# Patient Record
Sex: MALE | Race: WHITE | NOT HISPANIC OR LATINO | URBAN - METROPOLITAN AREA
[De-identification: names, ages, dates, MRNs, and addresses within clinical notes are randomized per-mention and may not be internally consistent; named-entity substitution may affect disease eponyms.]

---

## 2018-01-25 NOTE — PATIENT DISCUSSION
CATARACT, OU - VISUALLY SIGNIFICANT. SCHEDULE PHACO WITH IOL OD FIRST THEN OS IF VISUAL SYMPTOMS PERSIST. GLASSES RX GIVEN TO FILL IF DESIRES IN THE EVENT PATIENT DOES NOT PROCEED WITH SURGERY. MUST HAVE RETINAL CLEARANCE FROM DR. Schuyler Urena WITH CATARACT SURGERY.

## 2018-01-25 NOTE — PATIENT DISCUSSION
NONPROLIFERATIVE DIABETIC RETINOPATHY, OU: WITHOUT EDEMA OD, WITH EDEMA OS. REFER TO DR. Bearden Speaker FOR EVALUATION AND POSSIBLE TREATMENT.

## 2018-02-21 NOTE — PATIENT DISCUSSION
NONPROLIFERATIVE DIABETIC RETINOPATHY, OU:  NO CSDME OU. STABLE.  RETURN FOR FOLLOW-UP AS SCHEDULED FOR DILATED EYE EXAM.

## 2018-02-22 NOTE — PATIENT DISCUSSION
Surgery Drop Counseling:  I have prescribed Besivance, Ilevro and Durezol for use as directed before and after cataract surgery.

## 2018-03-07 NOTE — PATIENT DISCUSSION
Continue: Ilevro (nepafenac): drops,suspension: 0.3% 1 drop every morning as directed into right eye 02-

## 2018-03-07 NOTE — PATIENT DISCUSSION
Comments: Start one week prior to surgery and continue for 8 weeks after surgery. Called patient 2/23.  (ME)

## 2018-03-07 NOTE — PATIENT DISCUSSION
S/P PC IOL, OD: GOOD POST OP RESULT. STOP ANTIBIOTIC DROP IN ONE WEEK. CONTINUE OTHER DROPS AS DIRECTED UNTIL FURTHER INSTRUCTION. RETURN FOR FOLLOW-UP AS SCHEDULED.

## 2018-03-12 NOTE — PATIENT DISCUSSION
*Pre-Op 2nd Eye Counseling: The patient has noticed an improvement in their visual symptoms in the operative eye. The patient complains of decreased vision in the fellow eye when driving and watching television. It was explained to the patient that the decision to proceed with cataract surgery in the fellow eye is entirely a separate decision from the surgical eye. All of the same risks, benefits and alternatives ere reviewed with the patient again. The patient does feel the vision in the non-operative eye is limiting their daily activities and elects to proceed with surgery in the cataract eye.

## 2018-03-12 NOTE — PATIENT DISCUSSION
Continue as directed in the right eye until April 30th. Start in the left eye 7 days prior to surgery and continue for 8 weeks after surgery.

## 2018-03-12 NOTE — PATIENT DISCUSSION
Continue in the right eye for 1 more day, then stop. Start in the left eye 2 days prior to surgery and continue as directed.

## 2018-03-21 NOTE — PATIENT DISCUSSION
S/P PC IOL, OS : GOOD POST OP RESULT. STOP ANTIBIOTIC DROP IN ONE WEEK. CONTINUE OTHER DROPS AS DIRECTED UNTIL FURTHER INSTRUCTION. CONTINUE ILEVRO QAM UNTIL 05/16/2018. RETURN FOR FOLLOW-UP IN 2 WEEKS.

## 2018-03-21 NOTE — PATIENT DISCUSSION
Continue: Besivance (besifloxacin): drops,suspension: 0.6% 1 drop three times a day as directed into affected eye 02-

## 2018-04-09 NOTE — PATIENT DISCUSSION
Post-Op Instructions OS: Durezol: 1 times per day for 1 week, then discontinue. Ilevro (Nepafenac) 1 time per day for 1 week, then discontinue.

## 2019-09-14 NOTE — PATIENT DISCUSSION
EXAM DESCRIPTION:  CT CERVICAL SPINE WITHOUT; CT FACIAL AREA WITHOUT; CT HEAD WITHOUT



COMPLETED DATE/TIME:  9/14/2019 10:14 am



REASON FOR STUDY:  fall, syncope, r periorbital injury



COMPARISON:  See below.



TECHNIQUE:  Axial images acquired through the brain, facial bones, cervical spine without intravenous
 contrast.  Images reviewed with brain, subdural, lung, soft tissue and bone windows.  Reconstructed 
coronal and sagittal MPR images reviewed.  Images stored on PACS.

All CT scanners at this facility use dose modulation, iterative reconstruction, and/or weight based d
osing when appropriate to reduce radiation dose to as low as reasonably achievable (ALARA).

[facility]: Dose Right  [facility]: CareDose    [facility]: Dose Right    [facility]: Teradose 4D    [facility]: Smart Technologies



RADIATION DOSE:  CT Rad equipment meets quality standard of care and radiation dose reduction techniq
ues were employed. CTDIvol: 21.5 mGy. DLP: 420 mGy-cm.; CT Rad equipment meets quality standard of ca
re and radiation dose reduction techniques were employed. CTDIvol: 30.4 mGy. DLP: 517 mGy-cm.; CT Rad
 equipment meets quality standard of care and radiation dose reduction techniques were employed. CTDI
vol: 53.2 mGy. DLP: 1070 mGy-cm. mGy.



LIMITATIONS:  None.



FINDINGS:  Brain

2019 comparison.  Right frontal scalp hematoma without underlying fracture.  No acute intracranial ab
normality.  Mild small vessel disease.  No hydrocephalus.  Mild sphenoid sinus mucosal thickening.

Face

No facial fracture.  Orbits intact.

Cervical spine

2014 comparison.  Osteopenic.  Spondylosis with disc disease most pronounced at C4-5 and C5-6.  No fr
acture or malalignment.  Soft tissues normal.



IMPRESSION:

1. Soft tissue injury right frontal scalp.

2. No acute intracranial abnormality.

3. No facial fracture.

4. No cervical spine fracture or malalignment.



TECHNICAL DOCUMENTATION:  JOB ID:  [id number]

Quality ID # [id number]: Final reports with documentation of one or more dose reduction techniques (e.g., Au
tomated exposure control, adjustment of the mA and/or kV according to patient size, use of iterative 
reconstruction technique)

 2011 [employer]- All Rights Reserved



Reading location - IP/workstation name: [email or web address] - Daily wearOD+1.00+[id number]+[id number]/25 -2&nbsp;SN &nbsp; &nbsp;

## 2022-10-11 NOTE — PATIENT DISCUSSION
Medications: Review of Systems   Constitutional: Negative. Negative for appetite change, chills and fatigue. Eyes: Negative. Negative for pain, redness and visual disturbance. Respiratory: Negative. Negative for cough, shortness of breath and wheezing. Cardiovascular: Negative. Negative for chest pain and leg swelling. Gastrointestinal: Negative. Negative for abdominal pain, constipation, diarrhea, nausea and vomiting. Genitourinary: Negative. Negative for difficulty urinating, dysuria, flank pain, frequency, hematuria and urgency. Musculoskeletal: Negative. Negative for back pain, joint swelling and myalgias. Skin: Negative. Negative for rash and wound. Neurological: Negative. Negative for dizziness, weakness and numbness. Hematological: Negative. Does not bruise/bleed easily.

## 2022-11-14 ENCOUNTER — CONSULTATION/EVALUATION (OUTPATIENT)
Dept: URBAN - METROPOLITAN AREA CLINIC 14 | Facility: CLINIC | Age: 32
End: 2022-11-14

## 2022-11-14 DIAGNOSIS — H52.7: ICD-10-CM

## 2022-11-14 PROCEDURE — 99499LK REFRACTIVE CONSULT/LASIK

## 2022-11-14 ASSESSMENT — KERATOMETRY
OS_AXISANGLE_DEGREES: 84
OS_K2POWER_DIOPTERS: 45.75
OD_K1POWER_DIOPTERS: 44.75
OD_AXISANGLE2_DEGREES: 2
OD_AXISANGLE_DEGREES: 92
OS_K1POWER_DIOPTERS: 45.00
OD_K2POWER_DIOPTERS: 45.50
OS_AXISANGLE2_DEGREES: 174

## 2022-11-14 ASSESSMENT — VISUAL ACUITY
OD_SC: 20/70
OD_BCVA: 20/20
OS_SC: 20/60
OS_CC: 20/20
OD_CC: 20/20
OS_BCVA: 20/20

## 2022-11-14 ASSESSMENT — TONOMETRY
OD_IOP_MMHG: 15
OS_IOP_MMHG: 16

## 2023-01-11 ASSESSMENT — VISUAL ACUITY
OD_BCVA: 20/20
OS_BCVA: 20/20

## 2023-01-12 ENCOUNTER — CLINIC PROCEDURE ONLY (OUTPATIENT)
Dept: URBAN - METROPOLITAN AREA CLINIC 14 | Facility: CLINIC | Age: 33
End: 2023-01-12

## 2023-01-12 DIAGNOSIS — H52.7: ICD-10-CM

## 2023-01-12 PROCEDURE — 66999NC LASER SUITE OFFICE PROCEDURE NO CHARGE

## 2023-01-13 ENCOUNTER — CLINIC PROCEDURE ONLY (OUTPATIENT)
Dept: URBAN - METROPOLITAN AREA CLINIC 14 | Facility: CLINIC | Age: 33
End: 2023-01-13

## 2023-01-13 DIAGNOSIS — Z98.890: ICD-10-CM

## 2023-01-13 DIAGNOSIS — H52.7: ICD-10-CM

## 2023-01-13 PROCEDURE — 99024LK POST OP LASIK CARE ONLY

## 2023-01-13 ASSESSMENT — VISUAL ACUITY
OD_SC: 20/20
OU_SC: 20/20
OS_SC: 20/20

## 2023-01-19 ENCOUNTER — POST-OP (OUTPATIENT)
Dept: URBAN - METROPOLITAN AREA CLINIC 14 | Facility: CLINIC | Age: 33
End: 2023-01-19

## 2023-01-19 DIAGNOSIS — Z98.890: ICD-10-CM

## 2023-01-19 DIAGNOSIS — H52.7: ICD-10-CM

## 2023-01-19 PROCEDURE — 99024LK POST OP LASIK CARE ONLY

## 2023-01-19 ASSESSMENT — VISUAL ACUITY
OS_SC: 20/20
OD_SC: 20/20
OU_SC: 20/20

## 2023-04-20 ENCOUNTER — POST-OP (OUTPATIENT)
Dept: URBAN - METROPOLITAN AREA CLINIC 14 | Facility: CLINIC | Age: 33
End: 2023-04-20

## 2023-04-20 DIAGNOSIS — Z98.890: ICD-10-CM

## 2023-04-20 DIAGNOSIS — H52.7: ICD-10-CM

## 2023-04-20 PROCEDURE — 99024LK POST OP LASIK CARE ONLY

## 2023-04-20 ASSESSMENT — VISUAL ACUITY
OD_SC: 20/15
OS_BCVA: 20/15
OD_BCVA: 20/15
OS_SC: 20/15
OU_SC: 20/15

## 2023-07-20 ENCOUNTER — POST-OP (OUTPATIENT)
Dept: URBAN - METROPOLITAN AREA CLINIC 14 | Facility: CLINIC | Age: 33
End: 2023-07-20

## 2023-07-20 DIAGNOSIS — Z98.890: ICD-10-CM

## 2023-07-20 PROCEDURE — 99024LK POST OP LASIK CARE ONLY

## 2023-07-20 ASSESSMENT — VISUAL ACUITY
OD_SC: 20/20
OS_SC: 20/20

## 2024-08-12 ENCOUNTER — OFFICE VISIT (OUTPATIENT)
Facility: LOCATION | Age: 34
End: 2024-08-12
Payer: COMMERCIAL

## 2024-08-12 DIAGNOSIS — H43.13 VITREOUS HEMORRHAGE, BILATERAL: ICD-10-CM

## 2024-08-12 DIAGNOSIS — E10.3513 TYPE 1 DIAB WITH PROLIF DIAB RTNOP WITH MACULAR EDEMA, BI: ICD-10-CM

## 2024-08-12 DIAGNOSIS — E10.3521 TYPE 1 DIAB W PROLIF DIAB RTNOP W TRCTN DTCH MACULA, R EYE: Primary | ICD-10-CM

## 2024-08-12 DIAGNOSIS — H25.13 AGE-RELATED NUCLEAR CATARACT, BILATERAL: ICD-10-CM

## 2024-08-12 PROCEDURE — 67028 INJECTION EYE DRUG: CPT | Performed by: OPHTHALMOLOGY

## 2024-08-12 PROCEDURE — 92134 CPTRZ OPH DX IMG PST SGM RTA: CPT | Performed by: OPHTHALMOLOGY

## 2024-08-12 PROCEDURE — 99204 OFFICE O/P NEW MOD 45 MIN: CPT | Performed by: OPHTHALMOLOGY

## 2024-08-12 ASSESSMENT — INTRAOCULAR PRESSURE
OS: 19
OD: 15

## 2024-09-20 ENCOUNTER — OFFICE VISIT (OUTPATIENT)
Dept: URBAN - METROPOLITAN AREA CLINIC 86 | Facility: CLINIC | Age: 34
End: 2024-09-20
Payer: COMMERCIAL

## 2024-09-20 DIAGNOSIS — E10.3521 TYPE 1 DIABETES MELLITUS WITH PROLIFERATIVE DIABETIC RETINOPATHY WITH TRACTION RETINAL DETACHMENT INVOLVING THE MACULA, RIGHT EYE: Primary | ICD-10-CM

## 2024-09-20 PROCEDURE — 67028 INJECTION EYE DRUG: CPT | Performed by: OPHTHALMOLOGY

## 2024-09-20 ASSESSMENT — INTRAOCULAR PRESSURE
OD: 17
OS: 18

## 2024-09-23 ENCOUNTER — POST-OPERATIVE VISIT (OUTPATIENT)
Dept: URBAN - METROPOLITAN AREA CLINIC 13 | Facility: CLINIC | Age: 34
End: 2024-09-23
Payer: COMMERCIAL

## 2024-09-23 DIAGNOSIS — E10.3521 TYPE 1 DIAB W PROLIF DIAB RTNOP W TRCTN DTCH MACULA, R EYE: Primary | ICD-10-CM

## 2024-09-23 PROCEDURE — 99024 POSTOP FOLLOW-UP VISIT: CPT | Performed by: OPHTHALMOLOGY

## 2024-09-23 ASSESSMENT — INTRAOCULAR PRESSURE
OS: 14
OD: 6

## 2024-10-07 ENCOUNTER — POST-OPERATIVE VISIT (OUTPATIENT)
Dept: URBAN - METROPOLITAN AREA CLINIC 86 | Facility: CLINIC | Age: 34
End: 2024-10-07
Payer: COMMERCIAL

## 2024-10-07 DIAGNOSIS — E10.3521 TYPE 1 DIAB W PROLIF DIAB RTNOP W TRCTN DTCH MACULA, R EYE: Primary | ICD-10-CM

## 2024-10-07 PROCEDURE — 99024 POSTOP FOLLOW-UP VISIT: CPT | Performed by: OPHTHALMOLOGY

## 2024-10-07 ASSESSMENT — INTRAOCULAR PRESSURE
OD: 30
OS: 19

## 2024-11-04 ENCOUNTER — POST-OPERATIVE VISIT (OUTPATIENT)
Dept: URBAN - METROPOLITAN AREA CLINIC 86 | Facility: CLINIC | Age: 34
End: 2024-11-04
Payer: COMMERCIAL

## 2024-11-04 DIAGNOSIS — E10.3521 TYPE 1 DIAB W PROLIF DIAB RTNOP W TRCTN DTCH MACULA, R EYE: Primary | ICD-10-CM

## 2024-11-04 DIAGNOSIS — E10.3513 TYPE 1 DIABETES MELLITUS WITH PROLIFERATIVE DIABETIC RETINOPATHY WITH MACULAR EDEMA, BILATERAL: ICD-10-CM

## 2024-11-04 PROCEDURE — 67028 INJECTION EYE DRUG: CPT | Performed by: OPHTHALMOLOGY

## 2024-11-04 PROCEDURE — 99024 POSTOP FOLLOW-UP VISIT: CPT | Performed by: OPHTHALMOLOGY

## 2024-11-04 ASSESSMENT — INTRAOCULAR PRESSURE
OD: 22
OS: 18

## 2024-12-16 ENCOUNTER — POST-OPERATIVE VISIT (OUTPATIENT)
Dept: URBAN - METROPOLITAN AREA CLINIC 86 | Facility: CLINIC | Age: 34
End: 2024-12-16
Payer: COMMERCIAL

## 2024-12-16 DIAGNOSIS — E10.3513 TYPE 1 DIAB WITH PROLIF DIAB RTNOP WITH MACULAR EDEMA, BI: Primary | ICD-10-CM

## 2024-12-16 PROCEDURE — 99024 POSTOP FOLLOW-UP VISIT: CPT | Performed by: OPHTHALMOLOGY

## 2024-12-16 ASSESSMENT — INTRAOCULAR PRESSURE
OS: 17
OD: 18